# Patient Record
Sex: FEMALE | Race: WHITE | NOT HISPANIC OR LATINO | Employment: OTHER | URBAN - NONMETROPOLITAN AREA
[De-identification: names, ages, dates, MRNs, and addresses within clinical notes are randomized per-mention and may not be internally consistent; named-entity substitution may affect disease eponyms.]

---

## 2023-11-21 ENCOUNTER — OFFICE VISIT (OUTPATIENT)
Dept: CARDIOLOGY | Facility: CLINIC | Age: 73
End: 2023-11-21
Payer: MEDICARE

## 2023-11-21 VITALS
HEART RATE: 70 BPM | DIASTOLIC BLOOD PRESSURE: 58 MMHG | BODY MASS INDEX: 22.19 KG/M2 | SYSTOLIC BLOOD PRESSURE: 130 MMHG | HEIGHT: 60 IN | WEIGHT: 113 LBS

## 2023-11-21 DIAGNOSIS — R55 SYNCOPE, UNSPECIFIED SYNCOPE TYPE: Primary | ICD-10-CM

## 2023-11-21 DIAGNOSIS — E78.2 MIXED HYPERLIPIDEMIA: ICD-10-CM

## 2023-11-21 PROBLEM — C80.1 CANCER (MULTI): Status: ACTIVE | Noted: 2023-11-21

## 2023-11-21 PROBLEM — E78.5 HYPERLIPIDEMIA: Status: ACTIVE | Noted: 2023-11-21

## 2023-11-21 PROCEDURE — 1159F MED LIST DOCD IN RCRD: CPT | Performed by: INTERNAL MEDICINE

## 2023-11-21 PROCEDURE — 3008F BODY MASS INDEX DOCD: CPT | Performed by: INTERNAL MEDICINE

## 2023-11-21 PROCEDURE — 1160F RVW MEDS BY RX/DR IN RCRD: CPT | Performed by: INTERNAL MEDICINE

## 2023-11-21 PROCEDURE — 99213 OFFICE O/P EST LOW 20 MIN: CPT | Performed by: INTERNAL MEDICINE

## 2023-11-21 PROCEDURE — 1036F TOBACCO NON-USER: CPT | Performed by: INTERNAL MEDICINE

## 2023-11-21 RX ORDER — ALENDRONATE SODIUM 70 MG/1
70 TABLET ORAL
COMMUNITY

## 2023-11-21 RX ORDER — VENLAFAXINE 37.5 MG/1
37.5 TABLET ORAL DAILY
COMMUNITY

## 2023-11-21 RX ORDER — PRAVASTATIN SODIUM 20 MG/1
20 TABLET ORAL DAILY
COMMUNITY

## 2023-11-21 NOTE — PROGRESS NOTES
Subjective   Roz Prasad is a 72 y.o. female       Chief Complaint    Follow-up          HPI     Patient is here for follow-up.  She was evaluated in the past for unexplained syncope.  Cardiac work-up was benign.  She reports no syncopal episodes over the last almost 2 years.  She denies any complaint of chest pain, palpitation, lightheadedness, dizziness or syncope.  She remains reasonably active.    Assessment     1. Previous evaluation for syncope. Cardiac work-up was benign. No recurrence  2. Hyperlipidemia on pravastatin unclear to me whether she needs the criteria for treatment  3. Pattern consistent with whitecoat hypertension  4. Patient underwent oophorectomy for removal of tumor and apparently has received chemotherapy seem to be doing well since then     Plan     1. I advised the patient to continue current management and to discuss with her PCP the value of statin therapy at her age  2. Advised the patient to notify me with change in cardiac status or symptoms  3. I will see her on as-needed basis in the future     Review of Systems   All other systems reviewed and are negative.         Visit Vitals  /58 (BP Location: Right arm, Patient Position: Sitting)   Pulse 70   Ht 1.524 m (5')   Wt 51.3 kg (113 lb)   BMI 22.07 kg/m²   Smoking Status Never   BSA 1.47 m²        Objective   Physical Exam  Constitutional:       Appearance: Normal appearance. She is normal weight.   HENT:      Nose: Nose normal.   Neck:      Vascular: No carotid bruit.   Cardiovascular:      Rate and Rhythm: Normal rate.      Pulses: Normal pulses.      Heart sounds: Normal heart sounds.   Pulmonary:      Effort: Pulmonary effort is normal.   Abdominal:      General: Bowel sounds are normal.      Palpations: Abdomen is soft.   Genitourinary:     Rectum: Normal.   Musculoskeletal:         General: Normal range of motion.      Cervical back: Normal range of motion.      Right lower leg: No edema.      Left lower leg: No  edema.   Skin:     General: Skin is warm and dry.   Neurological:      General: No focal deficit present.      Mental Status: She is alert.   Psychiatric:         Mood and Affect: Mood normal.         Behavior: Behavior normal.         Thought Content: Thought content normal.         Judgment: Judgment normal.         Current Medications    Current Outpatient Medications:     alendronate (Fosamax) 70 mg tablet, Take 1 tablet (70 mg) by mouth every 7 days., Disp: , Rfl:     CALCIUM CITRATE-VITAMIN D3 ORAL, Take 1 tablet by mouth 2 times a day., Disp: , Rfl:     pravastatin (Pravachol) 20 mg tablet, Take 1 tablet (20 mg) by mouth once daily., Disp: , Rfl:     venlafaxine (Effexor) 37.5 mg tablet, Take 1 tablet (37.5 mg) by mouth once daily., Disp: , Rfl:                      Assessment/Plan   1. Syncope, unspecified syncope type        2. Mixed hyperlipidemia        3. BMI 22.0-22.9, adult

## 2023-11-21 NOTE — PATIENT INSTRUCTIONS
Please bring all medicines, vitamins, and herbal supplements with you when you come to the office.    Prescriptions will not be filled unless you are compliant with your follow up appointments or have a follow up appointment scheduled as per instruction of your physician. Refills should be requested at the time of your visit.     Follow up as needed